# Patient Record
(demographics unavailable — no encounter records)

---

## 2025-05-05 NOTE — PLAN
[TextEntry] : - Will order tumor markers and full hepatic panel: CBC, CMP, CEA, CA 19-9 -MRCP to asses pancreatic cyst stability -RTO ?

## 2025-05-05 NOTE — HISTORY OF PRESENT ILLNESS
[de-identified] : Surgical oncology   Referral:   Chief Complaint: pancreatic cyst incidentally found on US for surveillance of known renal cyst.     Visit Details: Accompanied by daughter Angelica who translates in [place] Korean.      Interval History: 78 y/o male a pmhx of COPD, RA, GERD presenting s/p US noted 0.7cm cystic lesion at the pancreatic head. Pt denies abdominal pain, n/v, diarrhea, hematochezia or floating stools. Denies hx of pancreatitis or pancreas malignancy.    Past Medical History: COPD, RA, GERD Past Surgical History: Former smoker, denies ETOH use or illicit drugs. Social History:  Family History:

## 2025-05-05 NOTE — ASSESSMENT
[FreeTextEntry1] : 76 y/o male a pmhx of COPD, RA, GERD presenting s/p US noted 0.7cm cystic lesion at the pancreatic head.   US abd ([facility])- 0.7 cm pancreas head lesions. No intrahepatic biliary duct dilation CBD measures 0.44cm. 1.6 cm and smaller gallstones noted.

## 2025-05-16 NOTE — CONSULT LETTER
[Dear  ___] : Dear  [unfilled], [Consult Letter:] : I had the pleasure of evaluating your patient, [unfilled]. [( Thank you for referring [unfilled] for consultation for _____ )] : Thank you for referring [unfilled] for consultation for [unfilled] [Please see my note below.] : Please see my note below. [Sincerely,] : Sincerely, [FreeTextEntry3] : Markus Martinez DO  [address] [facility], [address] [address] Ph: [phone]

## 2025-05-16 NOTE — ASSESSMENT
[FreeTextEntry1] : 78 y/o male a pmhx of COPD, RA, GERD presenting s/p US noted 0.7cm cystic lesion at the pancreatic head.  US abd ([facility])- 0.7 cm pancreas head lesions. No intrahepatic biliary duct dilation CBD measures 0.44cm. 1.6 cm and smaller gallstones noted.   05/14/2025- CA 19-9-9                     CEA - 2.8

## 2025-05-16 NOTE — CONSULT LETTER
[Dear  ___] : Dear  [unfilled], [Consult Letter:] : I had the pleasure of evaluating your patient, [unfilled]. [( Thank you for referring [unfilled] for consultation for _____ )] : Thank you for referring [unfilled] for consultation for [unfilled] [Please see my note below.] : Please see my note below. [Sincerely,] : Sincerely, [FreeTextEntry3] : Markus Martinez DO  [address] [facility], [address] [address] Ph: [phone]

## 2025-05-16 NOTE — PLAN
[TextEntry] : -MRCP to assess pancreatic cyst stability -Discuss yearly surveillance at this time- Will discuss at pancreatic cyst clinic -Tele appt to discuss MRCP results in 2 weeks

## 2025-05-16 NOTE — ASSESSMENT
[FreeTextEntry1] : 76 y/o male a pmhx of COPD, RA, GERD presenting s/p US noted 0.7cm cystic lesion at the pancreatic head.  US abd ([facility])- 0.7 cm pancreas head lesions. No intrahepatic biliary duct dilation CBD measures 0.44cm. 1.6 cm and smaller gallstones noted.   05/14/2025- CA 19-9-9                     CEA - 2.8

## 2025-05-16 NOTE — PHYSICAL EXAM
[Normal] : supple, no neck mass and thyroid not enlarged [Normal Neck Lymph Nodes] : normal neck lymph nodes  [Normal Supraclavicular Lymph Nodes] : normal supraclavicular lymph nodes [Normal Groin Lymph Nodes] : normal groin lymph nodes [Normal Axillary Lymph Nodes] : normal axillary lymph nodes [Normal] : oriented to person, place and time, with appropriate affect [FreeTextEntry1] :   COVID -19 precautions as per [facility] policy was universally followed. [de-identified] : Abdomen soft, nontender, nondistended, no palpable masses.

## 2025-05-16 NOTE — PHYSICAL EXAM
[Normal] : supple, no neck mass and thyroid not enlarged [Normal Neck Lymph Nodes] : normal neck lymph nodes  [Normal Supraclavicular Lymph Nodes] : normal supraclavicular lymph nodes [Normal Groin Lymph Nodes] : normal groin lymph nodes [Normal Axillary Lymph Nodes] : normal axillary lymph nodes [Normal] : oriented to person, place and time, with appropriate affect [FreeTextEntry1] :   COVID -19 precautions as per [facility] policy was universally followed. [de-identified] : Abdomen soft, nontender, nondistended, no palpable masses.

## 2025-05-16 NOTE — HISTORY OF PRESENT ILLNESS
[de-identified] : Surgical oncology   Referral: Dr. Joanie Jewell (PCP)   Chief Complaint: pancreatic cyst incidentally found on US for surveillance of known renal cyst.     Visit Details: Accompanied by daughter Angeilca who translates in [place].    HPI: 78 y/o male a pmhx of COPD, RA, GERD presenting s/p US noted 0.7cm cystic lesion at the pancreatic head. Pt denies abdominal pain, n/v, diarrhea, hematochezia or floating stools. Denies hx of pancreatitis or pancreas malignancy. Last endoscopy/colonoscopy 4 y/a at [facility] with Dr. Palafox with some polyps removed from the colon and endoscopy was found to have a hiatal hernia.    Past Medical History: COPD, RA, GERD Past Surgical History: Former smoker denies ETOH use or illicit drugs. Social History: Smoked for 20 years, socially ETOH use, denies illicit drug use, lives with wife and son.  Family History: no malignancy hx    05/16/2025 Pt reports to the office today feeling well. Denies abdominal pain, n/v, diarrhea, blood in the stool, tolerating diet, and denies weight loss in the last 2 months. No generalized jaundice noted or acholic stools. C/o intermittent GERD, taking Pepcid and Nexium PRN.

## 2025-05-16 NOTE — HISTORY OF PRESENT ILLNESS
[de-identified] : Surgical oncology   Referral: Dr. Joanie Jewell (PCP)   Chief Complaint: pancreatic cyst incidentally found on US for surveillance of known renal cyst.     Visit Details: Accompanied by daughter Angelica who translates in Nepalese.    HPI: 76 y/o male a pmhx of COPD, RA, GERD presenting s/p US noted 0.7cm cystic lesion at the pancreatic head. Pt denies abdominal pain, n/v, diarrhea, hematochezia or floating stools. Denies hx of pancreatitis or pancreas malignancy. Last endoscopy/colonoscopy 4 y/a at [facility] with Dr. Palafox with some polyps removed from the colon and endoscopy was found to have a hiatal hernia.    Past Medical History: COPD, RA, GERD Past Surgical History: Former smoker denies ETOH use or illicit drugs. Social History: Smoked for 20 years, socially ETOH use, denies illicit drug use, lives with wife and son.  Family History: no malignancy hx    05/16/2025 Pt reports to the office today feeling well. Denies abdominal pain, n/v, diarrhea, blood in the stool, tolerating diet, and denies weight loss in the last 2 months. No generalized jaundice noted or acholic stools. C/o intermittent GERD, taking Pepcid and Nexium PRN.

## 2025-05-30 NOTE — CONSULT LETTER
[Dear  ___] : Dear  [unfilled], [Consult Letter:] : I had the pleasure of evaluating your patient, [unfilled]. [( Thank you for referring [unfilled] for consultation for _____ )] : Thank you for referring [unfilled] for consultation for [unfilled] [Please see my note below.] : Please see my note below. [Sincerely,] : Sincerely, [FreeTextEntry3] : DO Glynn Ramos Director of pancreatic robotic surgery  [address] [facility], [address] [address] Ph: [phone]

## 2025-05-30 NOTE — CONSULT LETTER
[Dear  ___] : Dear  [unfilled], [Consult Letter:] : I had the pleasure of evaluating your patient, [unfilled]. [( Thank you for referring [unfilled] for consultation for _____ )] : Thank you for referring [unfilled] for consultation for [unfilled] [Please see my note below.] : Please see my note below. [Sincerely,] : Sincerely, [FreeTextEntry3] : DO Glynn Ramos Director of pancreatic robotic surgery  [address] [facility], [address] [address] Ph: [phone]

## 2025-05-30 NOTE — ASSESSMENT
[FreeTextEntry1] : 78 y/o male a pmhx of COPD, RA, GERD, ASYMPTOMATIC, presenting s/p US noted 0.7cm cystic lesion at the pancreatic head. MRCP demonstrating a 1cm intermediate lesion in the pancreatic head possibly isoenhancing with solid component. Recommending EUS.   US abd ([facility])- 0.7 cm pancreas head lesions. No intrahepatic biliary duct dilation CBD measures 0.44cm. 1.6 cm and smaller gallstones noted.   05/14/2025- CA 19-9-9                     CEA - 2.8  05/23/2025- MRCP ([facility])- 1cm T2 intermediate lesion in the pancreatic neck/body which is not definitively cystic and possibly isoenhancing tough evaluation is limited due to motion or artifact. The main pancreatic duct is not dilated.

## 2025-05-30 NOTE — CONSULT LETTER
[Dear  ___] : Dear  [unfilled], [Consult Letter:] : I had the pleasure of evaluating your patient, [unfilled]. [( Thank you for referring [unfilled] for consultation for _____ )] : Thank you for referring [unfilled] for consultation for [unfilled] [Please see my note below.] : Please see my note below. [Sincerely,] : Sincerely, [FreeTextEntry3] : DO Glynn Ramos Director of pancreatic robotic surgery  [address] [facility], [address] [address] Ph: [phone]

## 2025-05-30 NOTE — ASSESSMENT
[FreeTextEntry1] : 76 y/o male a pmhx of COPD, RA, GERD, ASYMPTOMATIC, presenting s/p US noted 0.7cm cystic lesion at the pancreatic head. MRCP demonstrating a 1cm intermediate lesion in the pancreatic head possibly isoenhancing with solid component. Recommending EUS.   US abd ([facility])- 0.7 cm pancreas head lesions. No intrahepatic biliary duct dilation CBD measures 0.44cm. 1.6 cm and smaller gallstones noted.   05/14/2025- CA 19-9-9                     CEA - 2.8  05/23/2025- MRCP ([facility])- 1cm T2 intermediate lesion in the pancreatic neck/body which is not definitively cystic and possibly isoenhancing tough evaluation is limited due to motion or artifact. The main pancreatic duct is not dilated.

## 2025-05-30 NOTE — HISTORY OF PRESENT ILLNESS
[de-identified] : Surgical oncology   Referral: Dr. Joanie Jewell (PCP)   Chief Complaint: pancreatic cyst incidentally found on US for surveillance of known renal cyst.   HPI: 78 y/o male a pmhx of COPD, RA, GERD presenting s/p US noted 0.7cm cystic lesion at the pancreatic head. Pt denies abdominal pain, n/v, diarrhea, hematochezia or floating stools. Denies hx of pancreatitis or pancreas malignancy. Last endoscopy/colonoscopy 4 y/a at [facility] with Dr. Palafox with some polyps removed from the colon and endoscopy was found to have a hiatal hernia.    Past Medical History: COPD, RA, GERD Past Surgical History: Former smoker denies ETOH use or illicit drugs. Social History: Smoked for 20 years, socially ETOH use, denies illicit drug use, lives with wife and son.  Family History: no malignancy hx    05/16/2025 Pt reports to the office today feeling well. Denies abdominal pain, n/v, diarrhea, blood in the stool, tolerating diet, and denies weight loss in the last 2 months. No generalized jaundice noted or acholic stools. C/o intermittent GERD, taking Pepcid and Nexium PRN.   05/30/2025 Spoke to daughter Angelica who translated in [place]ambian stating patient feels well. Denies nausea, vomiting, diarrhea, hematochezia or steatorrhea, and unintentional weight loss at this time in the past 2 weeks.

## 2025-05-30 NOTE — PLAN
[TextEntry] : -Reviewed MRI results with patient  -EUS recommended for potential solid mass of the pancreatic head with Dr. Stratton- Appt was offered -Appt for EUS was deferred despite encouragement. -MRCP will be order for 6 months to stablish stability   I have discussed the diagnosis, therapeutic plan and options with the patient at length. Patient expressed verbal understanding to proceed with proposed plan. All questions answered.

## 2025-05-30 NOTE — REASON FOR VISIT
[Other:____] : [unfilled] [Home] : at home, [unfilled] , at the time of the visit. [Medical Office: ([facility])___] : at the medical office located in  [Telephone (audio)] : This telephonic visit was provided via audio only technology. [Patient preference] : patient preference. [Verbal consent obtained from patient] : the patient, [unfilled] [Follow-Up Visit] : a follow-up visit for [FreeTextEntry2] : pancreatic cyst  2023 10:20

## 2025-05-30 NOTE — HISTORY OF PRESENT ILLNESS
[de-identified] : Surgical oncology   Referral: Dr. Joanie Jewell (PCP)   Chief Complaint: pancreatic cyst incidentally found on US for surveillance of known renal cyst.   HPI: 78 y/o male a pmhx of COPD, RA, GERD presenting s/p US noted 0.7cm cystic lesion at the pancreatic head. Pt denies abdominal pain, n/v, diarrhea, hematochezia or floating stools. Denies hx of pancreatitis or pancreas malignancy. Last endoscopy/colonoscopy 4 y/a at [facility] with Dr. Palafox with some polyps removed from the colon and endoscopy was found to have a hiatal hernia.    Past Medical History: COPD, RA, GERD Past Surgical History: Former smoker denies ETOH use or illicit drugs. Social History: Smoked for 20 years, socially ETOH use, denies illicit drug use, lives with wife and son.  Family History: no malignancy hx    05/16/2025 Pt reports to the office today feeling well. Denies abdominal pain, n/v, diarrhea, blood in the stool, tolerating diet, and denies weight loss in the last 2 months. No generalized jaundice noted or acholic stools. C/o intermittent GERD, taking Pepcid and Nexium PRN.   05/30/2025 Spoke to daughter Angelica who translated in Slovenian stating patient feels well. Denies nausea, vomiting, diarrhea, hematochezia or steatorrhea, and unintentional weight loss at this time in the past 2 weeks.

## 2025-05-30 NOTE — REASON FOR VISIT
[Other:____] : [unfilled] [Home] : at home, [unfilled] , at the time of the visit. [Medical Office: ([facility])___] : at the medical office located in  [Telephone (audio)] : This telephonic visit was provided via audio only technology. [Patient preference] : patient preference. [Verbal consent obtained from patient] : the patient, [unfilled] [Follow-Up Visit] : a follow-up visit for [FreeTextEntry2] : pancreatic cyst

## 2025-05-30 NOTE — REASON FOR VISIT
[Other:____] : [unfilled] [Home] : at home, [unfilled] , at the time of the visit. [Medical Office: ([facility])___] : at the medical office located in  [Telephone (audio)] : This telephonic visit was provided via audio only technology. [Patient preference] : patient preference. [Verbal consent obtained from patient] : the patient, [unfilled] [Follow-Up Visit] : a follow-up visit for [FreeTextEntry2] : pancreatic cyst

## 2025-05-30 NOTE — HISTORY OF PRESENT ILLNESS
[de-identified] : Surgical oncology   Referral: Dr. Joanie Jewell (PCP)   Chief Complaint: pancreatic cyst incidentally found on US for surveillance of known renal cyst.   HPI: 78 y/o male a pmhx of COPD, RA, GERD presenting s/p US noted 0.7cm cystic lesion at the pancreatic head. Pt denies abdominal pain, n/v, diarrhea, hematochezia or floating stools. Denies hx of pancreatitis or pancreas malignancy. Last endoscopy/colonoscopy 4 y/a at [facility] with Dr. Palafox with some polyps removed from the colon and endoscopy was found to have a hiatal hernia.    Past Medical History: COPD, RA, GERD Past Surgical History: Former smoker denies ETOH use or illicit drugs. Social History: Smoked for 20 years, socially ETOH use, denies illicit drug use, lives with wife and son.  Family History: no malignancy hx    05/16/2025 Pt reports to the office today feeling well. Denies abdominal pain, n/v, diarrhea, blood in the stool, tolerating diet, and denies weight loss in the last 2 months. No generalized jaundice noted or acholic stools. C/o intermittent GERD, taking Pepcid and Nexium PRN.   05/30/2025 Spoke to daughter Angelica who translated in Cuban stating patient feels well. Denies nausea, vomiting, diarrhea, hematochezia or steatorrhea, and unintentional weight loss at this time in the past 2 weeks.